# Patient Record
Sex: FEMALE | URBAN - METROPOLITAN AREA
[De-identification: names, ages, dates, MRNs, and addresses within clinical notes are randomized per-mention and may not be internally consistent; named-entity substitution may affect disease eponyms.]

---

## 2022-06-17 ENCOUNTER — APPOINTMENT (RX ONLY)
Dept: URBAN - METROPOLITAN AREA CLINIC 15 | Facility: CLINIC | Age: 42
Setting detail: DERMATOLOGY
End: 2022-06-17

## 2022-06-17 VITALS — HEIGHT: 64 IN | WEIGHT: 131 LBS

## 2022-06-17 DIAGNOSIS — L57.8 OTHER SKIN CHANGES DUE TO CHRONIC EXPOSURE TO NONIONIZING RADIATION: ICD-10-CM

## 2022-06-17 DIAGNOSIS — L65.9 NONSCARRING HAIR LOSS, UNSPECIFIED: ICD-10-CM

## 2022-06-17 PROCEDURE — ? ADDITIONAL NOTES

## 2022-06-17 PROCEDURE — ? PRESCRIPTION MEDICATION MANAGEMENT

## 2022-06-17 PROCEDURE — ? COUNSELING

## 2022-06-17 PROCEDURE — 99203 OFFICE O/P NEW LOW 30 MIN: CPT

## 2022-06-17 ASSESSMENT — LOCATION SIMPLE DESCRIPTION DERM
LOCATION SIMPLE: LEFT CHEEK
LOCATION SIMPLE: SCALP
LOCATION SIMPLE: RIGHT CHEEK

## 2022-06-17 ASSESSMENT — LOCATION ZONE DERM
LOCATION ZONE: SCALP
LOCATION ZONE: FACE

## 2022-06-17 ASSESSMENT — LOCATION DETAILED DESCRIPTION DERM
LOCATION DETAILED: RIGHT CENTRAL MALAR CHEEK
LOCATION DETAILED: LEFT SUPERIOR PARIETAL SCALP
LOCATION DETAILED: LEFT MEDIAL MALAR CHEEK

## 2022-06-17 NOTE — PROCEDURE: ADDITIONAL NOTES
Additional Notes: .\\n\\n- Defer Nutrafol, patient tried and has failed medication.
Render Risk Assessment In Note?: no
Detail Level: Zone

## 2022-06-17 NOTE — HPI: HAIR LOSS
How Severe Is Your Hair Loss?: moderate
Additional History: Patient is in office for hair loss that started about two years ago. Pt states scalp is not itchy or tender. Pt has tried Nutrafol and it has not worked.

## 2022-06-17 NOTE — PROCEDURE: PRESCRIPTION MEDICATION MANAGEMENT
Render In Strict Bullet Format?: No
Plan: .\\n\\n- Pending lab work from patient\\n\\n- PRP injections to scalp quoted $600 per session, recommend 4-6 sessions spaced out every 4 weeks. Please drink a lot of water before appointment time.
Detail Level: Zone
Initiate Treatment: .\\n\\n- Apply Rogaine 5% foam to scalp every Monday-Friday at night.

## 2022-06-24 ENCOUNTER — APPOINTMENT (RX ONLY)
Dept: URBAN - METROPOLITAN AREA CLINIC 15 | Facility: CLINIC | Age: 42
Setting detail: DERMATOLOGY
End: 2022-06-24

## 2022-06-24 DIAGNOSIS — L65.9 NONSCARRING HAIR LOSS, UNSPECIFIED: ICD-10-CM

## 2022-06-24 PROCEDURE — ? ADDITIONAL NOTES

## 2022-06-24 PROCEDURE — ? COUNSELING

## 2022-06-24 PROCEDURE — ? ORDER TESTS

## 2022-06-24 ASSESSMENT — LOCATION SIMPLE DESCRIPTION DERM: LOCATION SIMPLE: SCALP

## 2022-06-24 ASSESSMENT — LOCATION DETAILED DESCRIPTION DERM: LOCATION DETAILED: LEFT SUPERIOR PARIETAL SCALP

## 2022-06-24 ASSESSMENT — LOCATION ZONE DERM: LOCATION ZONE: SCALP

## 2022-06-24 NOTE — PROCEDURE: ORDER TESTS
Expected Date Of Service: 06/24/2022
Bill For Surgical Tray: no
Performing Laboratory: 0
Billing Type: [employer]

## 2022-06-24 NOTE — PROCEDURE: ADDITIONAL NOTES
Render Risk Assessment In Note?: no
Additional Notes: .\\nPreviously obtained outside labs reviewed, see scanned docs, will add to order to eval. see additional labs to review prior to/at follow up.
Detail Level: Zone